# Patient Record
Sex: FEMALE | Race: WHITE | NOT HISPANIC OR LATINO | URBAN - METROPOLITAN AREA
[De-identification: names, ages, dates, MRNs, and addresses within clinical notes are randomized per-mention and may not be internally consistent; named-entity substitution may affect disease eponyms.]

---

## 2020-07-09 ENCOUNTER — OFFICE (OUTPATIENT)
Dept: URBAN - METROPOLITAN AREA CLINIC 11 | Facility: CLINIC | Age: 70
End: 2020-07-09

## 2020-07-09 VITALS
HEIGHT: 67 IN | DIASTOLIC BLOOD PRESSURE: 68 MMHG | HEART RATE: 75 BPM | WEIGHT: 202 LBS | SYSTOLIC BLOOD PRESSURE: 153 MMHG

## 2020-07-09 DIAGNOSIS — R07.89 OTHER CHEST PAIN: ICD-10-CM

## 2020-07-09 DIAGNOSIS — K21.9 GASTRO-ESOPHAGEAL REFLUX DISEASE WITHOUT ESOPHAGITIS: ICD-10-CM

## 2020-07-09 PROCEDURE — 99204 OFFICE O/P NEW MOD 45 MIN: CPT | Performed by: INTERNAL MEDICINE

## 2020-07-30 ENCOUNTER — OFFICE (OUTPATIENT)
Dept: URBAN - METROPOLITAN AREA TELEHEALTH 10 | Facility: TELEHEALTH | Age: 70
End: 2020-07-30

## 2020-07-30 VITALS — HEIGHT: 67 IN

## 2020-07-30 DIAGNOSIS — R07.89 OTHER CHEST PAIN: ICD-10-CM

## 2020-07-30 NOTE — SERVICEHPINOTES
69-year-old woman with history of fibromyalgia, dyslipidemia, hypothyroidism, arthritis, and coronary disease status post 5 stents (with the last stent placed in 2011) currently on aspirin who presents for follow-up visit via telehealth. She has been experiencing chest pain over the last 3 months, both with and without exertion. She recently had cardiac evaluation in May which was unremarkable, including echocardiogram, Holter, and stress test.  At the last visit, patient was put on Pepcid 40 mg daily, given that her symptoms had been partially improved with Mylanta.  Patient was not able to obtain this medication through her pharmacy, and ultimately she bought the over-the-counter version and started taking 10 mg daily.She is about 70-80% better with this medication, and she takes it prior to dinner.  She has had some associated nausea, and headache, but is not sure whether it is from this medication, or as a result of recently increasing her thyroid medication.  In general, patient does not like to be on medication, and is quite sensitive to the medications that she takes.

## 2020-07-30 NOTE — SERVICENOTES
In general, patient does not like to be on medication, and is quite sensitive to the medications that she takes., The duration of the telehealth visit was  8 minutes and 54  seconds.

## 2023-01-24 ENCOUNTER — OFFICE (OUTPATIENT)
Dept: URBAN - METROPOLITAN AREA CLINIC 104 | Facility: CLINIC | Age: 73
End: 2023-01-24

## 2023-01-24 VITALS
DIASTOLIC BLOOD PRESSURE: 59 MMHG | OXYGEN SATURATION: 96 % | HEIGHT: 67 IN | SYSTOLIC BLOOD PRESSURE: 171 MMHG | HEART RATE: 78 BPM | WEIGHT: 196 LBS

## 2023-01-24 DIAGNOSIS — R07.9 CHEST PAIN, UNSPECIFIED: ICD-10-CM

## 2023-01-24 DIAGNOSIS — K21.9 GASTRO-ESOPHAGEAL REFLUX DISEASE WITHOUT ESOPHAGITIS: ICD-10-CM

## 2023-01-24 PROCEDURE — 99214 OFFICE O/P EST MOD 30 MIN: CPT | Performed by: INTERNAL MEDICINE

## 2023-02-27 ENCOUNTER — OFFICE (OUTPATIENT)
Dept: URBAN - METROPOLITAN AREA TELEHEALTH 10 | Facility: TELEHEALTH | Age: 73
End: 2023-02-27

## 2023-02-27 VITALS — HEIGHT: 67 IN

## 2023-02-27 DIAGNOSIS — R07.9 CHEST PAIN, UNSPECIFIED: ICD-10-CM

## 2023-02-27 DIAGNOSIS — K21.9 GASTRO-ESOPHAGEAL REFLUX DISEASE WITHOUT ESOPHAGITIS: ICD-10-CM

## 2023-02-27 NOTE — SERVICEHPINOTES
72-year-old woman with history of fibromyalgia, dyslipidemia, hypothyroidism, arthritis, and coronary disease status post 5 stents (with the last stent placed in 2011) currently on aspirin who presents for follow-up visit7/30/2020 Telehealth visit: She has been experiencing chest pain over the last 3 months, both with and without exertion. She recently had cardiac evaluation in May which was unremarkable, including echocardiogram, Holter, and stress test. At the last visit, patient was put on Pepcid 40 mg daily, given that her symptoms had been partially improved with Mylanta. Patient was not able to obtain this medication through her pharmacy, and ultimately she bought the over-the-counter version and started taking 10 mg daily.She is about 70-80% better with this medication, and she takes it prior to dinner. She has had some associated nausea, and headache, but is not sure whether it is from this medication, or as a result of recently increasing her thyroid medication.In general, patient does not like to be on medication, and is quite sensitive to the medications that she takes.1/24/23: Patient developed acute onset sore throat, fevers and chills on December 15th, and since then, has had symptoms that have not quite gone away. She does have a sensation of odynophagia, and recently began taking Pepcid 10 mg about 7 days ago. Main concern at this time is that she has always had overlapping cardiac and GI symptoms. Back at the time of her initial stent placement, she had had a normal stress test about 1 month prior to the actual cardiac catheterization which diagnosed her with a 99% blockage in the LAD. Patient will see her cardiologist Dr. Palm this THU. 
br
br   2/27/23: Patient reports that she is feeling about 30% better since I saw her at the last visit. She did follow-up with her cardiologist, and had an EKG done that was normal. She is scheduled for CT angiogram on March 17th. Patient has also made other changes, such as wearing looser fitting clothes, eating little earlier in the evening, and avoiding  trigger foods.  patient reports that she felt somewhat lightheaded and dizzy, and felt like she experienced some skipped beats when she went up  on the increased dose of Pepcid.  She was not able to tolerate the higher dose, and currently remains on Pepcid once a day.  Patient also feels that the hoarse voice she was experiencing previously has improved and no significant odynophagia at this time.

## 2023-04-14 ENCOUNTER — OFFICE (OUTPATIENT)
Dept: URBAN - METROPOLITAN AREA TELEHEALTH 11 | Facility: TELEHEALTH | Age: 73
End: 2023-04-14

## 2023-04-14 VITALS — HEIGHT: 67 IN

## 2023-04-14 DIAGNOSIS — K21.9 GASTRO-ESOPHAGEAL REFLUX DISEASE WITHOUT ESOPHAGITIS: ICD-10-CM

## 2023-04-14 DIAGNOSIS — Z86.010 PERSONAL HISTORY OF COLONIC POLYPS: ICD-10-CM

## 2023-04-14 NOTE — SERVICEHPINOTES
72-year-old woman with history of fibromyalgia, dyslipidemia, hypothyroidism, arthritis, and coronary disease status post 5 stents (with the last stent placed in 2011) currently on aspirin who presents for follow-up visit7/30/2020 Telehealth visit: She has been experiencing chest pain over the last 3 months, both with and without exertion. She recently had cardiac evaluation in May which was unremarkable, including echocardiogram, Holter, and stress test. At the last visit, patient was put on Pepcid 40 mg daily, given that her symptoms had been partially improved with Mylanta. Patient was not able to obtain this medication through her pharmacy, and ultimately she bought the over-the-counter version and started taking 10 mg daily.She is about 70-80% better with this medication, and she takes it prior to dinner. She has had some associated nausea, and headache, but is not sure whether it is from this medication, or as a result of recently increasing her thyroid medication.In general, patient does not like to be on medication, and is quite sensitive to the medications that she takes.1/24/23: Patient developed acute onset sore throat, fevers and chills on December 15th, and since then, has had symptoms that have not quite gone away. She does have a sensation of odynophagia, and recently began taking Pepcid 10 mg about 7 days ago. Main concern at this time is that she has always had overlapping cardiac and GI symptoms. Back at the time of her initial stent placement, she had had a normal stress test about 1 month prior to the actual cardiac catheterization which diagnosed her with a 99% blockage in the LAD. Patient will see her cardiologist Dr. Palm this THU.2/27/23: Patient reports that she is feeling about 30% better since I saw her at the last visit. She did follow-up with her cardiologist, and had an EKG done that was normal. She is scheduled for CT angiogram on March 17th. Patient has also made other changes, such as wearing looser fitting clothes, eating little earlier in the evening, and avoiding trigger foods. patient reports that she felt somewhat lightheaded and dizzy, and felt like she experienced some skipped beats when she went up on the increased dose of Pepcid. She was not able to tolerate the higher dose, and currently remains on Pepcid once a day. Patient also feels that the hoarse voice she was experiencing previously has improved and no significant odynophagia at this time.
br 
br
4/14/23:  Patient reports that since last visit she has been feeling better, and has less episodes of breakthrough acid reflux. She has not been on pepcid last 3 weeks, because of how it makes her feel with respect to anxiety and nerves, and also a hard time urinating.  All of these symptoms seem to improve when she stopped the Pepcid. She was started on isosorbide dinitrate (taking 5 mg daily by her cardiologist due to recent CTA showed various noncritical blockages. Pt is on 2nd week of dinitrate.  She reports that her previously placed stents were all patent.

## 2023-07-14 ENCOUNTER — OFFICE (OUTPATIENT)
Dept: URBAN - METROPOLITAN AREA PATHOLOGY 12 | Facility: PATHOLOGY | Age: 73
End: 2023-07-14

## 2023-07-14 ENCOUNTER — AMBULATORY SURGICAL CENTER (OUTPATIENT)
Dept: URBAN - METROPOLITAN AREA SURGERY 3 | Facility: SURGERY | Age: 73
End: 2023-07-14

## 2023-07-14 VITALS
TEMPERATURE: 97.9 F | OXYGEN SATURATION: 95 % | OXYGEN SATURATION: 97 % | TEMPERATURE: 97.3 F | DIASTOLIC BLOOD PRESSURE: 76 MMHG | RESPIRATION RATE: 14 BRPM | HEART RATE: 88 BPM | DIASTOLIC BLOOD PRESSURE: 74 MMHG | OXYGEN SATURATION: 97 % | HEIGHT: 67 IN | OXYGEN SATURATION: 96 % | DIASTOLIC BLOOD PRESSURE: 74 MMHG | DIASTOLIC BLOOD PRESSURE: 76 MMHG | HEART RATE: 68 BPM | SYSTOLIC BLOOD PRESSURE: 137 MMHG | RESPIRATION RATE: 18 BRPM | DIASTOLIC BLOOD PRESSURE: 68 MMHG | DIASTOLIC BLOOD PRESSURE: 68 MMHG | DIASTOLIC BLOOD PRESSURE: 64 MMHG | SYSTOLIC BLOOD PRESSURE: 140 MMHG | HEART RATE: 70 BPM | OXYGEN SATURATION: 96 % | RESPIRATION RATE: 14 BRPM | HEART RATE: 68 BPM | DIASTOLIC BLOOD PRESSURE: 76 MMHG | HEART RATE: 72 BPM | SYSTOLIC BLOOD PRESSURE: 134 MMHG | SYSTOLIC BLOOD PRESSURE: 128 MMHG | DIASTOLIC BLOOD PRESSURE: 71 MMHG | RESPIRATION RATE: 14 BRPM | DIASTOLIC BLOOD PRESSURE: 85 MMHG | OXYGEN SATURATION: 98 % | HEIGHT: 67 IN | HEART RATE: 75 BPM | OXYGEN SATURATION: 98 % | OXYGEN SATURATION: 94 % | TEMPERATURE: 97.3 F | SYSTOLIC BLOOD PRESSURE: 128 MMHG | WEIGHT: 192 LBS | HEART RATE: 75 BPM | DIASTOLIC BLOOD PRESSURE: 64 MMHG | HEART RATE: 70 BPM | WEIGHT: 192 LBS | DIASTOLIC BLOOD PRESSURE: 71 MMHG | DIASTOLIC BLOOD PRESSURE: 85 MMHG | SYSTOLIC BLOOD PRESSURE: 140 MMHG | SYSTOLIC BLOOD PRESSURE: 137 MMHG | SYSTOLIC BLOOD PRESSURE: 134 MMHG | SYSTOLIC BLOOD PRESSURE: 154 MMHG | OXYGEN SATURATION: 95 % | HEART RATE: 68 BPM | RESPIRATION RATE: 18 BRPM | RESPIRATION RATE: 16 BRPM | OXYGEN SATURATION: 97 % | RESPIRATION RATE: 18 BRPM | OXYGEN SATURATION: 95 % | HEART RATE: 72 BPM | TEMPERATURE: 97.9 F | OXYGEN SATURATION: 96 % | TEMPERATURE: 97.3 F | DIASTOLIC BLOOD PRESSURE: 64 MMHG | SYSTOLIC BLOOD PRESSURE: 134 MMHG | HEART RATE: 72 BPM | HEART RATE: 75 BPM | DIASTOLIC BLOOD PRESSURE: 74 MMHG | SYSTOLIC BLOOD PRESSURE: 154 MMHG | HEART RATE: 88 BPM | OXYGEN SATURATION: 94 % | WEIGHT: 192 LBS | SYSTOLIC BLOOD PRESSURE: 140 MMHG | DIASTOLIC BLOOD PRESSURE: 85 MMHG | OXYGEN SATURATION: 94 % | HEART RATE: 88 BPM | SYSTOLIC BLOOD PRESSURE: 128 MMHG | SYSTOLIC BLOOD PRESSURE: 154 MMHG | DIASTOLIC BLOOD PRESSURE: 71 MMHG | OXYGEN SATURATION: 98 % | HEART RATE: 70 BPM | RESPIRATION RATE: 16 BRPM | HEIGHT: 67 IN | TEMPERATURE: 97.9 F | SYSTOLIC BLOOD PRESSURE: 137 MMHG | DIASTOLIC BLOOD PRESSURE: 68 MMHG | RESPIRATION RATE: 16 BRPM

## 2023-07-14 DIAGNOSIS — K62.1 RECTAL POLYP: ICD-10-CM

## 2023-07-14 DIAGNOSIS — K57.30 DIVERTICULOSIS OF LARGE INTESTINE WITHOUT PERFORATION OR ABS: ICD-10-CM

## 2023-07-14 DIAGNOSIS — Z12.11 ENCOUNTER FOR SCREENING FOR MALIGNANT NEOPLASM OF COLON: ICD-10-CM

## 2023-07-14 DIAGNOSIS — D12.0 BENIGN NEOPLASM OF CECUM: ICD-10-CM

## 2023-07-14 PROBLEM — K63.5 POLYP OF COLON: Status: ACTIVE | Noted: 2023-07-14

## 2023-07-14 PROCEDURE — 45380 COLONOSCOPY AND BIOPSY: CPT | Mod: PT | Performed by: INTERNAL MEDICINE

## 2023-07-14 PROCEDURE — 88305 TISSUE EXAM BY PATHOLOGIST: CPT | Performed by: PATHOLOGY
